# Patient Record
(demographics unavailable — no encounter records)

---

## 2024-10-16 NOTE — CONSULT LETTER
[Dear  ___] : Dear  [unfilled], [Consult Letter:] : I had the pleasure of evaluating your patient, [unfilled]. [Please see my note below.] : Please see my note below. [Consult Closing:] : Thank you very much for allowing me to participate in the care of this patient.  If you have any questions, please do not hesitate to contact me. [Sincerely,] : Sincerely, [FreeTextEntry3] : Sirena Hodges MD [facility] physician partners Pediatric gastroenterologist , [facility] of medicine at [facility] [phone] [email or web address]

## 2024-10-16 NOTE — REASON FOR VISIT
[Consultation Follow Up] : a consultation follow up  [Mother] : mother [[employer] ] : provided by [employer]   [Time Spent: ____ minutes] : Total time spent using  services: [unfilled] minutes. The patient's primary language is not English thus required  services. [Interpreters_IDNumber] : [id number] [Interpreters_FullName] : Mata [TWNoteComboBox1] : [place]

## 2024-10-16 NOTE — ASSESSMENT
[Educated Patient & Family about Diagnosis] : educated the patient and family about the diagnosis [FreeTextEntry1] : RAFA  is a5 month old full-term  male  here for consultation of loose stools, straining with stools and gas pain.   GI PCR notable for rotavirus most likely after vaccine.  Currently on Neocate and doing well.  Gaining weight at 25 g/day.  Has some spit up through the mouth and nose with some possible choking but no gagging.  Explained to mom that Neocate is thinner so spitting up may be secondary to that although he gained 1.6 kg since last visit  Continue Neocate.  Trial of 5-1/2 ounces per feed.  Can eliminate nighttime feed  Trial of 1 teaspoon per ounce of baby cereal in the bottle.   If no improvement and continues to have possible choking episodes would recommend famotidine 0.4 mL twice a day.  Can introduce solid foods.  Follow-up visit in 2 months

## 2024-10-16 NOTE — HISTORY OF PRESENT ILLNESS
[de-identified] : CHRISTOPHER RIOSREYES , is  a 5 month old male here for initial consultation for trouble with lactose, screaming with stooling and loud sounding gas  Using neocate 6 ounces every 3 hours.  occasional breastfeeding once a day.   stools are now 2/day and green.  no blood noted in the stool.  no mucus noted does spit up wiht out of mouth and nose. turns red.  And looks like he is choking when he is manage to clear secretions.  No cyanosis.  Did not start solid foods yet.  He does cry when the spit up comes out his mouth and nose and seems uncomfortable.    25 gm/day     Denies  easy bleeding or bruising, jaundice, hematochezia, melena, recurrent fevers or infection, mouth sores, joint swelling, vision changes, unintentional weight loss.   Denies choking, dysphagia, cyanosis with feeds.

## 2024-10-16 NOTE — PHYSICAL EXAM
[Well Developed] : well developed [NAD] : in no acute distress [PERRL] : pupils were equal, round, reactive to light  [Moist & Pink Mucous Membranes] : moist and pink mucous membranes [CTAB] : lungs clear to auscultation bilaterally [Regular Rate and Rhythm] : regular rate and rhythm [Normal S1, S2] : normal S1 and S2 [Normal Bowel Sounds] : normal bowel sounds [No HSM] : no hepatosplenomegaly appreciated [Large] : stool was large [Soft] : soft [Normal Tone] : normal tone [Well-Perfused] : well-perfused [Interactive] : interactive [icteric] : anicteric [Respiratory Distress] : no respiratory distress  [Distended] : non distended [Tender] : non tender [Tags] : no skin tags  [Fissure] : no anal fissures  [Edema] : no edema [Cyanosis] : no cyanosis [Rash] : no rash [Jaundice] : no jaundice [de-identified] : Noted to have some excoriated erythematous areas around the anus [de-identified] : Large soft bowel movement observed while examining

## 2025-06-25 NOTE — REASON FOR VISIT
[Consultation Follow Up] : a consultation follow up  [Mother] : mother [[employer] ] : provided by [employer]   [Interpreters_IDNumber] : [id number] [Interpreters_FullName] : amadeo [TWNoteComboBox1] : Icelandic

## 2025-06-25 NOTE — CONSULT LETTER
[Dear  ___] : Dear  [unfilled], [Consult Letter:] : I had the pleasure of evaluating your patient, [unfilled]. [Please see my note below.] : Please see my note below. [Consult Closing:] : Thank you very much for allowing me to participate in the care of this patient.  If you have any questions, please do not hesitate to contact me. [Sincerely,] : Sincerely, [FreeTextEntry3] : Sirena Hodges MD [facility] physician partners Pediatric gastroenterologist , [facility] of medicine at [facility] [phone] [email or web address]

## 2025-06-25 NOTE — ASSESSMENT
[Educated Patient & Family about Diagnosis] : educated the patient and family about the diagnosis [FreeTextEntry1] : 13-month-old here for follow-up of milk protein intolerance.  Last visit was October 2024.  Continues on Neocate.  Has had minimal dairy introduction.  Recommend continuing dairy introduction.  If no issues with area dairy introduction recommend whole milk.  Can try lactose-free milk as dad has lactose intolerance.  Can also try regular whole milk.  No need for further formula at this point given his age.  If has diarrhea or rashes with dairy intake would recommend Ripple or oat milk  Follow-up in 4 to 8 weeks with nurse practitioner Sandy Stanford for follow-up of above  I spent 30] mins face-to-face  along with  reviewing relevant imaging, labs and coordination of care

## 2025-06-25 NOTE — HISTORY OF PRESENT ILLNESS
[de-identified] : CHRISTOPHER RIOSREYES , is  a  13 mo Here for FU of milk protein intolerance. Last visit was in October 2024. tired small amounts of  yogurts with no issues. eats all types of food  Continues to drink Neocate 2 bottles per day. 3 stool/day   No abdominal pain nausea or vomiting.  Growing well.  No complaints. Weight gain is more than appropriate Had myringotomy tubes in the right ear  Denies  easy bleeding or bruising, jaundice, hematochezia, melena, recurrent fevers or infection, mouth sores, joint swelling, vision changes, unintentional weight loss.   Denies choking, dysphagia, cyanosis with feeds.